# Patient Record
(demographics unavailable — no encounter records)

---

## 2018-03-03 NOTE — EMERGENCY DEPARTMENT REPORT
HPI





- General


Chief Complaint: Nausea/Vomiting/Diarrhea


Time Seen by Provider: 03/03/18 14:49





- HPI


HPI: 





30-year-old -American female, presents to ED with nausea, vomiting, 9 

weeks GA.  She has not seen an OB/GYN doctor yet for this pregnancy.  Symptoms 

as been going on for the past week, worse today.  She is not taking any 

antiemetic this pregnancy.  Last pregnancy she took Zofran, Phenergan, with 

relief of her symptoms.





ED Past Medical Hx





- Past Medical History


Previous Medical History?: No


Hx Hypertension: No


Hx CVA: No





- Surgical History


Additional Surgical History: Knee Sx





- Social History


Smoking Status: Never Smoker


Substance Use Type: None





- Medications


Home Medications: 


 Home Medications











 Medication  Instructions  Recorded  Confirmed  Last Taken  Type


 


Ondansetron [Zofran Odt] 4 mg PO BIDAC PRN #30 tab.rapdis 03/03/18  Unknown Rx














ED Review of Systems


ROS: 


Stated complaint: N/V


Other details as noted in HPI





Comment: All other systems reviewed and negative


Gastrointestinal: nausea, vomiting





Physical Exam





- Physical Exam


Vital Signs: 


 Vital Signs











  03/03/18





  12:08


 


Temperature 98.6 F


 


Pulse Rate 67


 


Respiratory 18





Rate 


 


Blood Pressure 111/79


 


O2 Sat by Pulse 100





Oximetry 











Physical Exam: 





- Physical Exam


Physical Exam: 





- General


Limitations: No Limitations


General appearance: alert, in no apparent distress, obese





- Head


Head exam: Present: atraumatic, normocephalic





- Eye


Eye exam: Present: normal appearance





- ENT


ENT exam: Present: mucous membranes moist





- Neck


Neck exam: Present: normal inspection





- Respiratory


Respiratory exam: Present: normal lung sounds bilaterally.  Absent: respiratory 

distress





- Cardiovascular


Cardiovascular Exam: Present: normal rhythm, tachycardia.  Absent: systolic 

murmur, diastolic murmur, rubs, gallop





- GI/Abdominal


GI/Abdominal exam: Present: soft, normal bowel sounds





- Extremities Exam


Extremities exam: Present: normal inspection





- Back Exam


Back exam: Present: normal inspection





- Neurological Exam


Neurological exam: Present: alert, oriented X3





- Psychiatric


Psychiatric exam: normal affect and mood





- Skin


Skin exam: Present: warm, dry, intact, normal color.  Absent: rash





ED Course


 Vital Signs











  03/03/18





  12:08


 


Temperature 98.6 F


 


Pulse Rate 67


 


Respiratory 18





Rate 


 


Blood Pressure 111/79


 


O2 Sat by Pulse 100





Oximetry 














ED Medical Decision Making





- Lab Data


Result diagrams: 


 03/03/18 13:04





 03/03/18 12:57


Critical care attestation.: 


If time is entered above; I have spent that time in minutes in the direct care 

of this critically ill patient, excluding procedure time.








ED Disposition


Clinical Impression: 


 Vomiting of pregnancy





Disposition: DC-01 TO HOME OR SELFCARE


Is pt being admited?: No


Does the pt Need Aspirin: No


Condition: Stable


Instructions:  Hyperemesis Gravidarum (ED)


Prescriptions: 


Ondansetron [Zofran Odt] 4 mg PO BIDAC PRN #30 tab.rapdis


 PRN Reason: Vomiting


Referrals: 


PRIMARY CARE,MD [Primary Care Provider] - 3-5 Days

## 2018-03-03 NOTE — EMERGENCY DEPARTMENT REPORT
Blank Doc





- Documentation


Documentation: 





vomiting, 9weeks ga, no pain, no bleeding.  symptoms x 1 week.

## 2018-04-10 NOTE — EMERGENCY DEPARTMENT REPORT
ED Pregnancy HPI





- General


Chief complaint: Abdominal Pain


Stated complaint: NAUSEA/LOWER ABDOMINAL PAIN


Time Seen by Provider: 04/10/18 21:27


Source: patient


Mode of arrival: Ambulatory


Limitations: No Limitations





- History of Present Illness


Initial comments: 





30-year-old female is currently approximately 14 weeks pregnant presents to the 

hospital complaints of lower abdominal pain and persistent nausea and vomiting 

for 3 days.  Patient was seen here on the third for hyperemesis and received 

Zofran prescription with improvement which she currently does not have any 

antiemetic medication.  She then presented to [facility] on the 16th 

for the same symptoms.  States her blood type is O- and she received RhoGAM 

during her [facility] visit but denies that she was having any vaginal 

bleeding or pain at that time.  Patient now presents with intermittent 

suprapubic abdominal pain rated 6 out of 10 intensity.  Pain is wore with 

palpation.  No alleviating factors.  Denies vaginal bleeding. dysuria, fever, 

diarrhea, hematemesis, or hematochezia.  Patient complains of irritation to her 

left ear after sticking a Q-tip in its states it only hurts when she palpates 

the tragus and pain is gradually improving.  No drainage or decreased hearing 

reported.  Patient has her first prenatal visit scheduled with the group 

affiliated with [facility].  She plans to find a GYN doctor 

affiliated with this hospital due to her family's bad past experiences with 

[facility].  She has had ultrasound at 7 weeks gestation at a clinic.





- Related Data


 Previous Rx's











 Medication  Instructions  Recorded  Last Taken  Type


 


Ondansetron [Zofran Odt] 4 mg PO BIDAC PRN #30 tab.rapdis 03/03/18 Unknown Rx


 


Ondansetron [Zofran Odt] 4 mg PO Q8HR PRN #30 tab.rapdis 04/11/18 Unknown Rx











 Allergies











Allergy/AdvReac Type Severity Reaction Status Date / Time


 


No Known Allergies Allergy   Verified 04/10/18 20:39














ED Review of Systems


ROS: 


Stated complaint: NAUSEA/LOWER ABDOMINAL PAIN


Other details as noted in HPI





Comment: All other systems reviewed and negative





ED Past Medical Hx





- Past Medical History


Previous Medical History?: No


Hx Hypertension: No


Hx CVA: No





- Surgical History


Past Surgical History?: Yes


Additional Surgical History: R Knee Sx 2001





- Social History


Smoking Status: Never Smoker


Substance Use Type: None





- Medications


Home Medications: 


 Home Medications











 Medication  Instructions  Recorded  Confirmed  Last Taken  Type


 


Ondansetron [Zofran Odt] 4 mg PO BIDAC PRN #30 tab.aramisdis 03/03/18 04/10/18 

Unknown Rx


 


Ondansetron [Zofran Odt] 4 mg PO Q8HR PRN #30 tab.aramisdis 04/11/18  Unknown Rx














ED Physical Exam





- General


Limitations: No Limitations





- Other


Other exam information: 





General: No limitations, patient is alert in no acute distress


Head exam: Atraumatic, normocephalic


Eyes exam: Normal appearance


ENT: Dry mucous membrane


Neck exam: Normal inspection, full range of motion, no meningismus nontender


Respiratory exam: Clear to auscultation bilateral, no wheezes, rales, crackles


Cardiovascular: Normal rate and rhythm, normal heart sounds


Abdomen: Soft, nondistended, mild Cervantes the pubic tenderness, with normal bowel 

sounds, no rebound, or guarding


Extremity: Full range of motion normal inspection no deformity


Back: Normal Inspection, full range of motion, no tenderness


Neurologic: Alert, oriented x3, cranial nerves intact, no motor or sensory 

deficit


Psychiatric: normal affect, normal mood


Skin: Warm, dry, intact





ED Course


 Vital Signs











  04/10/18 04/10/18 04/10/18





  19:10 20:32 20:38


 


Temperature 99 F  


 


Pulse Rate 98 H  74


 


Respiratory 20  16





Rate   


 


Blood Pressure 112/77  


 


Blood Pressure   104/64





[Right]   


 


O2 Sat by Pulse 99 100 96





Oximetry   














  04/10/18 04/10/18 04/10/18





  20:45 21:00 21:15


 


Temperature   


 


Pulse Rate   


 


Respiratory   





Rate   


 


Blood Pressure 112/70 112/70 107/68


 


Blood Pressure   





[Right]   


 


O2 Sat by Pulse 100 100 100





Oximetry   














  04/10/18 04/10/18 04/10/18





  21:17 21:30 21:45


 


Temperature   


 


Pulse Rate 74  


 


Respiratory 16  





Rate   


 


Blood Pressure  107/68 109/72


 


Blood Pressure 107/62  





[Right]   


 


O2 Sat by Pulse 100 99 98





Oximetry   














  04/10/18 04/10/18





  22:00 23:04


 


Temperature  


 


Pulse Rate  


 


Respiratory  





Rate  


 


Blood Pressure 109/72 109/72


 


Blood Pressure  





[Right]  


 


O2 Sat by Pulse 100 99





Oximetry  














- Reevaluation(s)


Reevaluation #1: 





04/11/18 00:45


pt feeling better, nausea has improved. Tolerated PO KCL











ED Medical Decision Making





- Lab Data


Result diagrams: 


 04/10/18 19:22





 04/10/18 19:22








 Lab Results











  04/10/18 04/10/18 04/10/18 Range/Units





  19:22 19:22 19:22 


 


WBC  8.9    (4.5-11.0)  K/mm3


 


RBC  4.55    (3.65-5.03)  M/mm3


 


Hgb  12.8    (10.1-14.3)  gm/dl


 


Hct  38.5    (30.3-42.9)  %


 


MCV  85    (79-97)  fl


 


MCH  28    (28-32)  pg


 


MCHC  33    (30-34)  %


 


RDW  13.2    (13.2-15.2)  %


 


Plt Count  308    (140-440)  K/mm3


 


Lymph % (Auto)  26.3    (13.4-35.0)  %


 


Mono % (Auto)  7.0    (0.0-7.3)  %


 


Eos % (Auto)  2.8    (0.0-4.3)  %


 


Baso % (Auto)  0.3    (0.0-1.8)  %


 


Lymph #  2.3    (1.2-5.4)  K/mm3


 


Mono #  0.6    (0.0-0.8)  K/mm3


 


Eos #  0.2    (0.0-0.4)  K/mm3


 


Baso #  0.0    (0.0-0.1)  K/mm3


 


Seg Neutrophils %  63.6    (40.0-70.0)  %


 


Seg Neutrophils #  5.7    (1.8-7.7)  K/mm3


 


Sodium   136 L   (137-145)  mmol/L


 


Potassium   3.4 L   (3.6-5.0)  mmol/L


 


Chloride   97.5 L   ()  mmol/L


 


Carbon Dioxide   21 L   (22-30)  mmol/L


 


Anion Gap   21   mmol/L


 


BUN   6 L   (7-17)  mg/dL


 


Creatinine   0.4 L   (0.7-1.2)  mg/dL


 


Estimated GFR   > 60   ml/min


 


BUN/Creatinine Ratio   15   %


 


Glucose   83   ()  mg/dL


 


Calcium   9.9   (8.4-10.2)  mg/dL


 


Total Bilirubin   0.20   (0.1-1.2)  mg/dL


 


AST   34   (5-40)  units/L


 


ALT   37   (7-56)  units/L


 


Alkaline Phosphatase   36   ()  units/L


 


Total Protein   7.8   (6.3-8.2)  g/dL


 


Albumin   3.9   (3.9-5)  g/dL


 


Albumin/Globulin Ratio   1.0   %


 


HCG, Quant    369210 H  (0-4)  mIU/mL


 


Urine Color     (Yellow)  


 


Urine Turbidity     (Clear)  


 


Urine pH     (5.0-7.0)  


 


Ur Specific Gravity     (1.003-1.030)  


 


Urine Protein     (Negative)  mg/dL


 


Urine Glucose (UA)     (Negative)  mg/dL


 


Urine Ketones     (Negative)  mg/dL


 


Urine Blood     (Negative)  


 


Urine Nitrite     (Negative)  


 


Urine Bilirubin     (Negative)  


 


Urine Urobilinogen     (<2.0)  mg/dL


 


Ur Leukocyte Esterase     (Negative)  


 


Urine WBC (Auto)     (0.0-6.0)  /HPF


 


Urine RBC (Auto)     (0.0-6.0)  /HPF


 


U Epithel Cells (Auto)     (0-13.0)  /HPF


 


Urine Bacteria (Auto)     (Negative)  /HPF


 


Urine Mucus     /HPF














  04/10/18 Range/Units





  Unknown 


 


WBC   (4.5-11.0)  K/mm3


 


RBC   (3.65-5.03)  M/mm3


 


Hgb   (10.1-14.3)  gm/dl


 


Hct   (30.3-42.9)  %


 


MCV   (79-97)  fl


 


MCH   (28-32)  pg


 


MCHC   (30-34)  %


 


RDW   (13.2-15.2)  %


 


Plt Count   (140-440)  K/mm3


 


Lymph % (Auto)   (13.4-35.0)  %


 


Mono % (Auto)   (0.0-7.3)  %


 


Eos % (Auto)   (0.0-4.3)  %


 


Baso % (Auto)   (0.0-1.8)  %


 


Lymph #   (1.2-5.4)  K/mm3


 


Mono #   (0.0-0.8)  K/mm3


 


Eos #   (0.0-0.4)  K/mm3


 


Baso #   (0.0-0.1)  K/mm3


 


Seg Neutrophils %   (40.0-70.0)  %


 


Seg Neutrophils #   (1.8-7.7)  K/mm3


 


Sodium   (137-145)  mmol/L


 


Potassium   (3.6-5.0)  mmol/L


 


Chloride   ()  mmol/L


 


Carbon Dioxide   (22-30)  mmol/L


 


Anion Gap   mmol/L


 


BUN   (7-17)  mg/dL


 


Creatinine   (0.7-1.2)  mg/dL


 


Estimated GFR   ml/min


 


BUN/Creatinine Ratio   %


 


Glucose   ()  mg/dL


 


Calcium   (8.4-10.2)  mg/dL


 


Total Bilirubin   (0.1-1.2)  mg/dL


 


AST   (5-40)  units/L


 


ALT   (7-56)  units/L


 


Alkaline Phosphatase   ()  units/L


 


Total Protein   (6.3-8.2)  g/dL


 


Albumin   (3.9-5)  g/dL


 


Albumin/Globulin Ratio   %


 


HCG, Quant   (0-4)  mIU/mL


 


Urine Color  Yellow  (Yellow)  


 


Urine Turbidity  Clear  (Clear)  


 


Urine pH  5.0  (5.0-7.0)  


 


Ur Specific Gravity  1.019  (1.003-1.030)  


 


Urine Protein  30 mg/dl  (Negative)  mg/dL


 


Urine Glucose (UA)  Neg  (Negative)  mg/dL


 


Urine Ketones  80  (Negative)  mg/dL


 


Urine Blood  Mod  (Negative)  


 


Urine Nitrite  Neg  (Negative)  


 


Urine Bilirubin  Neg  (Negative)  


 


Urine Urobilinogen  2.0  (<2.0)  mg/dL


 


Ur Leukocyte Esterase  Mod  (Negative)  


 


Urine WBC (Auto)  4.0  (0.0-6.0)  /HPF


 


Urine RBC (Auto)  6.0  (0.0-6.0)  /HPF


 


U Epithel Cells (Auto)  35.0 H  (0-13.0)  /HPF


 


Urine Bacteria (Auto)  1+  (Negative)  /HPF


 


Urine Mucus  2+  /HPF














- Radiology Data


Radiology results: report reviewed





US





IMPRESSION: 


Single intrauterine gestation at 14 weeks and 5 days. Estimated 


due date: 10/04/2018. Placenta previa is noted with the inferior 


placental margin crossing the internal OS. 











- Medical Decision Making





Nausea and vomiting in pregnancy


Ultrasound reveals viable IUP


No signs of UTI


Patient received by mouth potassium for mild hypokalemia


F/u will be provided with options of several GYN doctors as an option to 

initiate her prenatal care


Zofran will be prescribed symptomatic treatment of breath nausea vomiting in 

pregnancy








- Differential Diagnosis


hyperemesis, dehydration, UTI, abnormal pregnancy


Critical Care Time: No


Critical care attestation.: 


If time is entered above; I have spent that time in minutes in the direct care 

of this critically ill patient, excluding procedure time.








ED Disposition


Clinical Impression: 


 Nausea and vomiting in pregnancy, Hypokalemia, 14 weeks gestation of pregnancy





Disposition: DC-01 TO HOME OR SELFCARE


Is pt being admited?: No


Does the pt Need Aspirin: No


Condition: Stable


Instructions:  Hyperemesis Gravidarum (ED), Hypokalemia (ED)


Additional Instructions: 


Follow-up with either of the GYN group/physicians provided.  Take the 

medication as prescribed.  Return if symptoms worsen as indicated by your 

discharge instructions.


Prescriptions: 


Ondansetron [Zofran Odt] 4 mg PO Q8HR PRN #30 tab.rapdis


 PRN Reason: Nausea And Vomiting


Referrals: 


JESSICA KEYES MD [Staff Physician] - 3-5 Days


DAVINA ANGULO MD [Staff Physician] - 3-5 Days


TOMY MCKEON MD [Staff Physician] - 3-5 Days


Time of Disposition: 01:08

## 2018-04-11 NOTE — ULTRASOUND REPORT
FINAL REPORT



PROCEDURE:  US OB TRANSVAGINAL



TECHNIQUE:  Real-time transabdominal sonography of the uterus,

placenta, amniotic fluid, adnexa, and fetus was performed with

image documentation. Measurements were obtained to determine

fetal age/size. M-mode Doppler was used to document fetal

heartbeat. Additional transvaginal images were obtained to

measure the cervical length. CPT 60087



HISTORY:  suprapubic abd pain, pregnant 



COMPARISON:  No prior studies are available for comparison.



FINDINGS:  

ADDITIONAL GESTATION: None.



GENERAL:



IUP: Single living intrauterine pregnancy.  Fetal Position:

Cephalic



Placental position: Anterior, with the inferior margin crossing

the internal os consistent with previa. Amniotic fluid volume:

Normal.



MATERNAL:



Uterus: Within normal limits. Cervical length: 4.2 cm. Internal

Os: Closed.



FETUS:



Heart rate and rhythm: 144 beats per minute, regular 



Fetal anatomic survey: Normal.



MEASUREMENTS:



BPD: 2.53 centimeters corresponding to 14 weeks and 3 days



HC: 9.55 centimeters corresponding to 14 weeks and 3 days



AC: 8.03 centimeters corresponding to 14 weeks and 3 days



FL: 1.77 centimeters corresponding to 15 weeks and 2 days



Mean Gestational Age (composite criteria): 14 weeks and 5 days



Fetal Ratio biometry: Normal.



Estimated Fetal Weight:  grams.



Estimated Due Date: 10/04/2018



IMPRESSION:  

Single intrauterine gestation at 14 weeks and 5 days. Estimated

due date: 10/04/2018. Placenta previa is noted with the inferior

placental margin crossing the internal OS.

## 2018-04-11 NOTE — ULTRASOUND REPORT
FINAL REPORT



PROCEDURE:  US OB &gt; = 14 WEEKS FETUS



TECHNIQUE:  Real-time transabdominal sonography of the uterus,

placenta, amniotic fluid, adnexa, and fetus was performed with

image documentation. Measurements were obtained to determine

fetal age/size. M-mode Doppler was used to document fetal

heartbeat. CPT 91074



HISTORY:  suprapubic abd pain, pregnant 



COMPARISON:  No prior studies are available for comparison.



FINDINGS:  

ADDITIONAL GESTATION: None.



GENERAL:



IUP: Single living intrauterine pregnancy.  Fetal Position:

Cephalic



Placental position: Anterior, with the inferior margin crossing

the internal os. Amniotic fluid volume: Normal.



MATERNAL:



Uterus: Within normal limits. Cervical length: 4.2 cm. Internal

Os: Closed.



FETUS:



Heart rate and rhythm: 144 beats per minute 



Fetal anatomic survey: Normal



MEASUREMENTS:



BPD: 2.53 centimeters corresponding to 14 weeks and 3 days



HC: 9.55 centimeters corresponding to 14 weeks and 3 days



AC: 8.03 centimeters corresponding to 14 weeks and 3 days



FL: 1.77 centimeters corresponding to 15 weeks and 2 days



Mean Gestational Age (composite criteria): 14 weeks and 5 days



Fetal Ratio biometry: Normal.



Estimated Due Date : 10/04/2018



IMPRESSION:  

Single intrauterine gestation at 14 weeks and 5 days. Estimated

due date: 10/04/2018. 



Placenta previa is noted with the inferior margin crossing the

internal os.

## 2018-09-27 NOTE — EVENT NOTE
Date: 09/27/18





patient comfortable after epidural 





NST cat 1





pelvic 9/100/-1





continue mgt

## 2018-09-27 NOTE — ULTRASOUND REPORT
ULTRASOUND OB LIMITED



History: Cephalic position



Technique:  Transabdominal ultrasound with Doppler interrogation.





Gestation: Single



Fetal Position: Cephalic



Fetal Heart Rate:

  116 BPM

## 2018-09-27 NOTE — PROCEDURE NOTE
OB Delivery Note





- Delivery


Date of Delivery: 18 (female @ 1100)


Surgeon: MARU MCCLELLAN


Estimated blood loss: 200cc





- Vaginal


Delivery presentation: vertex


Delivery position: OA


Intrapartum events: decreased FHT variability


Delivery induction: AROM


Delivery augmentation: rupture of membranes, pitocin


Delivery monitor: external FHT, external uterine


Delivery placenta: spontaneous


Delivery cord: 3 umbilical vessels


Episiotomy: none


Delivery laceration: none


Anesthesia: epidural





- Infant


  ** A


Apgar at 1 minute: 8


Apgar at 5 minutes: 9


Infant Gender: Female (Pushed for  viable female, placed skin to skin. 

Spont. placenta. Cord blood collected. Apgars 8/9. Pitocin infusing. No 

lacerations. Cord blood collected)

## 2018-09-28 NOTE — DISCHARGE SUMMARY
Providers





- Providers


Date of Admission: 


18 04:58





Date of discharge: 18


Attending physician: 


MALAIKA ANGULO MD





Primary care physician: 


MONIQUE MORALEZ








Hospitalization


Reason for admission: rupture of membranes


Delivery: 


Procedure details: 





Please see delivery note. 


Episiotomy: none


Laceration: none


Other postpartum procedures: none


Postpartum complications: none


Discharge diagnosis: IUP at term delivered


Flasher baby: female


Hospital course: 





Pt was admitted for rupture of membranes and went on to deliver a viable female 

 via spontaneous vaginal delivery. Her postpartum course was 

uncomplicated and she met discharge criteria on PPD#2. She will follow up in 4 

wks with Dr Moralez. 


Condition at discharge: Stable


Disposition: DC-01 TO HOME OR SELFCARE





- Discharge Diagnoses


(1) Term birth of female 


Status: Acute   





(2) Anemia


Status: Acute   


Qualifiers: 


   Anemia type: unspecified type   Qualified Code(s): D64.9 - Anemia, 

unspecified   





Plan





- Provider Discharge Summary


Activity: routine, no sex for 6 weeks, no heavy lifting 4 weeks, no strenuous 

exercise


Diet: routine


Instructions: routine


Additional instructions: 


[]  Smoking cessation referral if applicable(refer to patient education folder 

for contact #)


[]  Refer to [facility] Booklet








Call your doctor immediately for:


* Fever > 100.5


* Heavy vaginal bleeding ( >1 pad per hour)


* Severe persistent headache


* Shortness of breath


* Reddened, hot, painful area to leg or breast


* Drainage or odor from incision.





* Keep incision clean and dry at all times and follow doctor's instructions 

regarding bathing/showering











- Follow up plan


Follow up: 


MONIQUE MORALEZ MD [Primary Care Provider] - 10/25/18 (Please call to schedule 

postpartum appt )

## 2018-09-28 NOTE — PROGRESS NOTE
Assessment and Plan


A: PPD#1 s/p  at term; Asymptomatic anemia 


P: Routine postpartum care. Discharge tomorrow with follow up in 4 wks with Dr Moralez. 








Subjective





- Subjective


Date of service: 18


Principal diagnosis: s/p  at term 


Interval history: 


Patient without complaints.  No overnight events





Patient reports: appetite normal, voiding normally, pain well controlled, 

ambulating normally


: doing well





Objective





- Vital Signs


Latest vital signs: 


 Vital Signs











  Temp Pulse Resp BP Pulse Ox


 


 18 17:40    20  


 


 18 16:29  98.3 F  64  18  106/57 


 


 18 11:19    20  


 


 18 11:17    20  


 


 18 08:16  98.4 F  77  18  118/72 


 


 18 02:29    18  


 


 18 01:00  98.2 F  70  20  114/68  99


 


 18 22:22    20  


 


 18 21:40  98.6 F  71  20  119/70  98








 Intake and Output











 18





 06:59 14:59 22:59


 


Intake Total 240 120 240


 


Output Total 400  


 


Balance -160 120 240


 


Intake:   


 


  Oral 240 120 240


 


Output:   


 


  Urine 400  


 


    Void 400  


 


Other:   


 


  Total, Intake Amount 120 120 240


 


  Total, Output Amount 400  


 


  # Voids   


 


    Void 1 1 1














- Exam


Breasts: Present: deferred


Cardiovascular: Present: Regular rate


Lungs: Present: Clear to auscultation


Abdomen: Present: soft


Uterus: Present: fundal height at umbilicus


Extremities: Present: normal





- Labs


Labs: 


 Abnormal lab results











  18 Range/Units





  23:33 


 


Hgb  8.0 L  (10.1-14.3)  gm/dl


 


Hct  24.8 L D  (30.3-42.9)  %